# Patient Record
Sex: MALE | Race: WHITE | NOT HISPANIC OR LATINO | Employment: PART TIME | URBAN - METROPOLITAN AREA
[De-identification: names, ages, dates, MRNs, and addresses within clinical notes are randomized per-mention and may not be internally consistent; named-entity substitution may affect disease eponyms.]

---

## 2022-07-07 ENCOUNTER — OFFICE VISIT (OUTPATIENT)
Dept: URGENT CARE | Facility: CLINIC | Age: 22
End: 2022-07-07
Payer: COMMERCIAL

## 2022-07-07 VITALS
RESPIRATION RATE: 16 BRPM | OXYGEN SATURATION: 98 % | HEART RATE: 78 BPM | TEMPERATURE: 99.6 F | DIASTOLIC BLOOD PRESSURE: 78 MMHG | SYSTOLIC BLOOD PRESSURE: 110 MMHG

## 2022-07-07 DIAGNOSIS — L02.91 CUTANEOUS ABSCESS, UNSPECIFIED SITE: ICD-10-CM

## 2022-07-07 DIAGNOSIS — L03.113 CELLULITIS OF RIGHT UPPER EXTREMITY: Primary | ICD-10-CM

## 2022-07-07 PROCEDURE — 99213 OFFICE O/P EST LOW 20 MIN: CPT | Performed by: PHYSICIAN ASSISTANT

## 2022-07-07 PROCEDURE — 87205 SMEAR GRAM STAIN: CPT | Performed by: PHYSICIAN ASSISTANT

## 2022-07-07 PROCEDURE — 87186 SC STD MICRODIL/AGAR DIL: CPT | Performed by: PHYSICIAN ASSISTANT

## 2022-07-07 PROCEDURE — 87070 CULTURE OTHR SPECIMN AEROBIC: CPT | Performed by: PHYSICIAN ASSISTANT

## 2022-07-07 RX ORDER — SULFAMETHOXAZOLE AND TRIMETHOPRIM 400; 80 MG/1; MG/1
2 TABLET ORAL EVERY 12 HOURS SCHEDULED
COMMUNITY

## 2022-07-07 RX ORDER — AMOXICILLIN AND CLAVULANATE POTASSIUM 875; 125 MG/1; MG/1
1 TABLET, FILM COATED ORAL EVERY 12 HOURS SCHEDULED
COMMUNITY

## 2022-07-08 NOTE — PATIENT INSTRUCTIONS
Abscess   AMBULATORY CARE:   An abscess  is an area under the skin where pus (infected fluid) collects  An abscess is often caused by bacteria, fungi or other germs that get into an open wound  You can get an abscess anywhere on your body  Common signs and symptoms of an abscess: You may have a swollen mass that is red and painful  Pus may leak out of the mass  The pus will be white or yellow and may smell bad  You may have redness and pain days before the mass appears  You may have a fever and chills if the infection spreads  Seek immediate care if:   The area around your abscess becomes very painful, warm, or has red streaks  You have a fever and chills  Your heart is beating faster than usual     You feel faint or confused  Call your doctor if:   Your abscess gets bigger or does not get better  Your abscess returns  You have questions or concerns about your condition or care  Treatment for an abscess: Your healthcare provider may need to make a cut in the abscess to allow the pus to drain  You may need surgery to remove your abscess  You may  need any of the following:  Antibiotics  help treat a bacterial infection  Acetaminophen  decreases pain and fever  It is available without a doctor's order  Ask how much to take and how often to take it  Follow directions  Read the labels of all other medicines you are using to see if they also contain acetaminophen, or ask your doctor or pharmacist  Acetaminophen can cause liver damage if not taken correctly  Do not use more than 4 grams (4,000 milligrams) total of acetaminophen in one day  NSAIDs , such as ibuprofen, help decrease swelling, pain, and fever  This medicine is available with or without a doctor's order  NSAIDs can cause stomach bleeding or kidney problems in certain people  If you take blood thinner medicine, always ask your healthcare provider if NSAIDs are safe for you   Always read the medicine label and follow directions  Take your medicine as directed  Contact your healthcare provider if you think your medicine is not helping or if you have side effects  Tell him or her if you are allergic to any medicine  Keep a list of the medicines, vitamins, and herbs you take  Include the amounts, and when and why you take them  Bring the list or the pill bottles to follow-up visits  Carry your medicine list with you in case of an emergency  Self-care:   Apply a warm compress to your abscess  This will help it open and drain  Wet a washcloth in warm, but not hot, water  Apply the compress for 10 minutes  Repeat this 4 times each day  Do not  press on an abscess or try to open it with a needle  You may push the bacteria deeper or into your blood  Do not share your clothes, towels, or sheets with anyone  This can spread the infection to others  Wash your hands often  This can help prevent the spread of germs  Use soap and water or an alcohol-based hand rub  Care for your wound after it is drained:   Care for your wound as directed  If your healthcare provider says it is okay, carefully remove the bandage and gauze packing  You may need to soak the gauze to get it out of your wound  Clean your wound and the area around it as directed  Dry the area and put on new, clean bandages  Change your bandages when they get wet or dirty  Ask your healthcare provider how to change the gauze in your wound  Keep track of how many pieces of gauze are placed inside the wound  Do not put too much packing in the wound  Do not pack the gauze too tightly in your wound  Follow up with your healthcare provider in 1 to 3 days: You may need to have your packing removed or your bandage changed  Write down your questions so you remember to ask them during your visits  © Copyright [employer] 2022 Information is for End User's use only and may not be sold, redistributed or otherwise used for commercial purposes   All illustrations and images included in CareNotes® are the copyrighted property of [employer] D [employer]  or Codie Ellis   The above information is an  only  It is not intended as medical advice for individual conditions or treatments  Talk to your doctor, nurse or pharmacist before following any medical regimen to see if it is safe and effective for you  Cellulitis   AMBULATORY CARE:   Cellulitis  is a skin infection caused by bacteria  Cellulitis is common and can become severe  Cellulitis usually appears on the lower legs  It can also appear on the arms, face, and other areas  Cellulitis develops when bacteria enter a crack or break in your skin, such as a scratch, bite, or cut  Common signs and symptoms:  Signs and symptoms usually appear on one side of your body  You may have any of the following:  A fever    A red, warm, swollen area on your skin    Pain when the area is touched    Red spots, bumps, or blisters that may drain pus    Bumpy, raised skin that feels like an orange peel    Seek care immediately if:   Your wound gets larger and more painful  You feel a crackling under your skin when you touch it  You have purple dots or bumps on your skin, or you see bleeding under your skin  You see red streaks coming from the infected area  Call your doctor if:   The red, warm, swollen area gets larger  Your fever or pain does not go away or gets worse  The area does not get smaller after 3 days of antibiotics  You have questions or concerns about your condition or care  Treatment:  You should start to see improvement in 3 days  If your cellulitis is severe, you may need IV antibiotics in the hospital  If cellulitis is not treated, the infection can spread through your body and become life-threatening  You may need any of the following medicines:  Antibiotics  help treat the bacterial infection  Acetaminophen  decreases pain and fever   It is available without a doctor's order  Ask how much to take and how often to take it  Follow directions  Read the labels of all other medicines you are using to see if they also contain acetaminophen, or ask your doctor or pharmacist  Acetaminophen can cause liver damage if not taken correctly  Do not use more than 4 grams (4,000 milligrams) total of acetaminophen in one day  NSAIDs , such as ibuprofen, help decrease swelling, pain, and fever  This medicine is available with or without a doctor's order  NSAIDs can cause stomach bleeding or kidney problems in certain people  If you take blood thinner medicine, always ask your healthcare provider if NSAIDs are safe for you  Always read the medicine label and follow directions  Take your medicine as directed  Contact your healthcare provider if you think your medicine is not helping or if you have side effects  Tell him or her if you are allergic to any medicine  Keep a list of the medicines, vitamins, and herbs you take  Include the amounts, and when and why you take them  Bring the list or the pill bottles to follow-up visits  Carry your medicine list with you in case of an emergency  Self-care:   Wash the area with soap and water every day  Gently pat dry  Use bandages if directed by your healthcare provider  Elevate the area above the level of your heart  as often as you can  This will help decrease swelling and pain  Prop the area on pillows or blankets to keep it elevated comfortably  Place a cool, damp cloth on the area  Use clean cloths and clean water  You can do this as often as you need to  Cool, damp cloths may help decrease pain  Apply cream or ointment as directed  These help protect the area  Most over-the-counter products, such as petroleum jelly, are good to use  Ask your healthcare provider about specific creams or ointments you should use  Prevent cellulitis:   Do not scratch bug bites or areas of injury    You increase your risk for cellulitis by scratching these areas  Do not share personal items, such as towels, clothing, and razors  Clean exercise equipment  with germ-killing  before and after you use it  Treat athlete's foot  This can help prevent the spread of a bacterial skin infection  Wash your hands often  Use soap and water  Wash your hands after you use the bathroom, change a child's diapers, or sneeze  Wash your hands before you prepare or eat food  Use lotion to prevent dry, cracked skin  Follow up with your doctor within 3 days, or as directed:  He or she will check if your cellulitis is getting better  Write down your questions so you remember to ask them during your visits  © Copyright "[employer] 2022 Information is for End User's use only and may not be sold, redistributed or otherwise used for commercial purposes  All illustrations and images included in CareNotes® are the copyrighted property of A [employer] M [employer] Inc  or Codie Ellis   The above information is an  only  It is not intended as medical advice for individual conditions or treatments  Talk to your doctor, nurse or pharmacist before following any medical regimen to see if it is safe and effective for you

## 2022-07-11 LAB
BACTERIA WND AEROBE CULT: ABNORMAL
GRAM STN SPEC: ABNORMAL

## 2022-07-11 NOTE — PROGRESS NOTES
[facility]        NAME: Angelika Jimenez is a 23 y o  male  : 2000    MRN: [id number]  DATE: 2022  TIME: 12:33 AM    Assessment and Plan   Cellulitis of right upper extremity [L03 113]  1  Cellulitis of right upper extremity     2  Cutaneous abscess, unspecified site  Wound culture and Gram stain         Patient Instructions       Follow up with PCP in 3-5 days  Proceed to  ER if symptoms worsen  Chief Complaint     Chief Complaint   Patient presents with    Skin Problem     Began about one week ago and is getting larger  Pt was seen yesterday at an urgent care and was given bactrim and augmentin         History of Present Illness       51-year-old male presents the clinic with skin infection that started 1 week ago  Patient states that he was seen yesterday at a different Urgent Care and was given Bactrim and Augmentin for an infection to his right elbow/forearm  Patient states that he started the medication and has also done warm compresses to the area but notes soreness to the muscle around the area of redness and swelling and noticed that the area of redness appears to be larger than it was yesterday  Review of Systems   Review of Systems   Constitutional: Negative for chills and fever  Respiratory: Negative for chest tightness, shortness of breath and wheezing  Cardiovascular: Negative for chest pain and palpitations  Gastrointestinal: Negative for nausea and vomiting  Musculoskeletal: Positive for myalgias  Negative for arthralgias  Skin: Positive for color change and wound  Negative for rash  Neurological: Negative for dizziness, weakness, light-headedness, numbness and headaches  All other systems reviewed and are negative          Current Medications       Current Outpatient Medications:     amoxicillin-clavulanate (AUGMENTIN) 875-125 mg per tablet, Take 1 tablet by mouth every 12 (twelve) hours, Disp: , Rfl:     sulfamethoxazole-trimethoprim (BACTRIM) 400-80 mg per tablet, Take 2 tablets by mouth every 12 (twelve) hours, Disp: , Rfl:     Current Allergies     Allergies as of 07/07/2022    (No Known Allergies)            The following portions of the patient's history were reviewed and updated as appropriate: allergies, current medications, past family history, past medical history, past social history, past surgical history and problem list      Past Medical History:   Diagnosis Date    Patient denies medical problems        History reviewed  No pertinent surgical history  History reviewed  No pertinent family history  Medications have been verified  Objective   /78   Pulse 78   Temp 99 6 °F (37 6 °C)   Resp 16   SpO2 98%   No LMP for male patient  Physical Exam     Physical Exam  Vitals and nursing note reviewed  Constitutional:       General: He is not in acute distress  Appearance: He is well-developed  He is not diaphoretic  HENT:      Head: Normocephalic and atraumatic  Cardiovascular:      Pulses: Normal pulses  Pulmonary:      Effort: Pulmonary effort is normal    Musculoskeletal:         General: Normal range of motion  Right elbow: Swelling present  Normal range of motion  Tenderness present  Arms:       Comments: Area of localized erythema, swelling with approximately for pustules noted  Pustules were superficially opened with 18 gauge needle and wound culture was obtained to rule out MRSA  Discussed with patient that he needs to watch for worsening signs of infection and continue taking antibiotics as prescribed  No red streak noted on exam    Skin:     General: Skin is warm and dry  Neurological:      Mental Status: He is alert and oriented to person, place, and time

## 2023-06-18 ENCOUNTER — APPOINTMENT (EMERGENCY)
Dept: CT IMAGING | Facility: HOSPITAL | Age: 23
End: 2023-06-18
Payer: COMMERCIAL

## 2023-06-18 ENCOUNTER — HOSPITAL ENCOUNTER (EMERGENCY)
Facility: HOSPITAL | Age: 23
Discharge: HOME | End: 2023-06-18
Attending: EMERGENCY MEDICINE
Payer: COMMERCIAL

## 2023-06-18 VITALS
TEMPERATURE: 98.7 F | RESPIRATION RATE: 18 BRPM | SYSTOLIC BLOOD PRESSURE: 114 MMHG | HEART RATE: 94 BPM | OXYGEN SATURATION: 98 % | WEIGHT: 135 LBS | HEIGHT: 67 IN | DIASTOLIC BLOOD PRESSURE: 74 MMHG | BODY MASS INDEX: 21.19 KG/M2

## 2023-06-18 DIAGNOSIS — S29.019A THORACIC MYOFASCIAL STRAIN, INITIAL ENCOUNTER: ICD-10-CM

## 2023-06-18 DIAGNOSIS — S09.90XA MINOR HEAD INJURY, INITIAL ENCOUNTER: ICD-10-CM

## 2023-06-18 DIAGNOSIS — S00.81XA ABRASION OF FACE, INITIAL ENCOUNTER: ICD-10-CM
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
## 2023-06-18 DIAGNOSIS — S09.90XA MINOR CLOSED HEAD INJURY: ICD-10-CM

## 2023-06-18 LAB
ANION GAP SERPL CALCULATED.3IONS-SCNC: 6 MMOL/L (ref 4–13)
BASOPHILS # BLD AUTO: 0.03 THOUSANDS/ÂΜL (ref 0–0.1)
BASOPHILS NFR BLD AUTO: 0 % (ref 0–1)
BUN SERPL-MCNC: 11 MG/DL (ref 5–25)
CALCIUM SERPL-MCNC: 9.7 MG/DL (ref 8.4–10.2)
CHLORIDE SERPL-SCNC: 104 MMOL/L (ref 96–108)
CO2 SERPL-SCNC: 32 MMOL/L (ref 21–32)
CREAT SERPL-MCNC: 0.96 MG/DL (ref 0.6–1.3)
EOSINOPHIL # BLD AUTO: 0.3 THOUSAND/ÂΜL (ref 0–0.61)
EOSINOPHIL NFR BLD AUTO: 4 % (ref 0–6)
ERYTHROCYTE [DISTWIDTH] IN BLOOD BY AUTOMATED COUNT: 12 % (ref 11.6–15.1)
GFR SERPL CREATININE-BSD FRML MDRD: 112 ML/MIN/1.73SQ M
GLUCOSE SERPL-MCNC: 92 MG/DL (ref 65–140)
HCT VFR BLD AUTO: 50 % (ref 36.5–49.3)
HGB BLD-MCNC: 16.9 G/DL (ref 12–17)
IMM GRANULOCYTES # BLD AUTO: 0.01 THOUSAND/UL (ref 0–0.2)
IMM GRANULOCYTES NFR BLD AUTO: 0 % (ref 0–2)
LYMPHOCYTES # BLD AUTO: 2.56 THOUSANDS/ÂΜL (ref 0.6–4.47)
LYMPHOCYTES NFR BLD AUTO: 34 % (ref 14–44)
MCH RBC QN AUTO: 29.7 PG (ref 26.8–34.3)
MCHC RBC AUTO-ENTMCNC: 33.8 G/DL (ref 31.4–37.4)
MCV RBC AUTO: 88 FL (ref 82–98)
MONOCYTES # BLD AUTO: 0.54 THOUSAND/ÂΜL (ref 0.17–1.22)
MONOCYTES NFR BLD AUTO: 7 % (ref 4–12)
NEUTROPHILS # BLD AUTO: 4.09 THOUSANDS/ÂΜL (ref 1.85–7.62)
NEUTS SEG NFR BLD AUTO: 55 % (ref 43–75)
NRBC BLD AUTO-RTO: 0 /100 WBCS
PLATELET # BLD AUTO: 248 THOUSANDS/UL (ref 149–390)
PMV BLD AUTO: 9.8 FL (ref 8.9–12.7)
POTASSIUM SERPL-SCNC: 3.9 MMOL/L (ref 3.5–5.3)
RBC # BLD AUTO: 5.69 MILLION/UL (ref 3.88–5.62)
SODIUM SERPL-SCNC: 142 MMOL/L (ref 135–147)
WBC # BLD AUTO: 7.53 THOUSAND/UL (ref 4.31–10.16)

## 2023-06-18 PROCEDURE — 99284 EMERGENCY DEPT VISIT MOD MDM: CPT

## 2023-06-18 PROCEDURE — 71260 CT THORAX DX C+: CPT

## 2023-06-18 PROCEDURE — 85025 COMPLETE CBC W/AUTO DIFF WBC: CPT | Performed by: PHYSICIAN ASSISTANT

## 2023-06-18 PROCEDURE — 74177 CT ABD & PELVIS W/CONTRAST: CPT

## 2023-06-18 PROCEDURE — 36415 COLL VENOUS BLD VENIPUNCTURE: CPT | Performed by: PHYSICIAN ASSISTANT

## 2023-06-18 PROCEDURE — 80048 BASIC METABOLIC PNL TOTAL CA: CPT | Performed by: PHYSICIAN ASSISTANT

## 2023-06-18 PROCEDURE — 70450 CT HEAD/BRAIN W/O DYE: CPT

## 2023-06-18 RX ADMIN — IOHEXOL 100 ML: 350 INJECTION, SOLUTION INTRAVENOUS at 14:41

## 2023-06-18 NOTE — Clinical Note
Mercy Suzie was seen and treated in our emergency department on 6/18/2023  No restrictions            Diagnosis:     Alexia Dangelo    He may return on this date: If you have any questions or concerns, please don't hesitate to call        Ignacio Chen DO    ______________________________           _______________          _______________  Hospital Representative                              Date                                Time

## 2023-06-18 NOTE — Clinical Note
Charity Jacobo was seen and treated in our emergency department on 6/18/2023  No restrictions            Diagnosis:     Olga Lidia Woods    He may return on this date: If you have any questions or concerns, please don't hesitate to call        Evie Corado, DO    ______________________________           _______________          _______________  Hospital Representative                              Date                                Time
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
## 2024-01-21 ENCOUNTER — HOSPITAL ENCOUNTER (EMERGENCY)
Facility: HOSPITAL | Age: 24
Discharge: HOME | End: 2024-01-21
Attending: EMERGENCY MEDICINE
Payer: COMMERCIAL

## 2024-01-21 VITALS
BODY MASS INDEX: 21.97 KG/M2 | WEIGHT: 140 LBS | HEART RATE: 59 BPM | HEIGHT: 67 IN | OXYGEN SATURATION: 99 % | TEMPERATURE: 97.9 F | DIASTOLIC BLOOD PRESSURE: 64 MMHG | SYSTOLIC BLOOD PRESSURE: 120 MMHG | RESPIRATION RATE: 16 BRPM

## 2024-01-21 DIAGNOSIS — L02.619 CELLULITIS AND ABSCESS OF FOOT: Primary | ICD-10-CM

## 2024-01-21 DIAGNOSIS — L03.119 CELLULITIS AND ABSCESS OF FOOT: Primary | ICD-10-CM

## 2024-01-21 PROCEDURE — 99283 EMERGENCY DEPT VISIT LOW MDM: CPT

## 2024-01-21 PROCEDURE — 10060 I&D ABSCESS SIMPLE/SINGLE: CPT | Performed by: EMERGENCY MEDICINE

## 2024-01-21 PROCEDURE — 99284 EMERGENCY DEPT VISIT MOD MDM: CPT | Performed by: EMERGENCY MEDICINE

## 2024-01-21 RX ORDER — SULFAMETHOXAZOLE AND TRIMETHOPRIM 800; 160 MG/1; MG/1
1 TABLET ORAL 2 TIMES DAILY
Qty: 14 TABLET | Refills: 0 | Status: SHIPPED | OUTPATIENT
Start: 2024-01-21 | End: 2024-01-28

## 2024-01-21 RX ORDER — CEPHALEXIN 500 MG/1
500 CAPSULE ORAL EVERY 6 HOURS SCHEDULED
Qty: 28 CAPSULE | Refills: 0 | Status: SHIPPED | OUTPATIENT
Start: 2024-01-21 | End: 2024-01-28

## 2024-01-21 RX ORDER — CEPHALEXIN 250 MG/1
500 CAPSULE ORAL ONCE
Status: COMPLETED | OUTPATIENT
Start: 2024-01-21 | End: 2024-01-21

## 2024-01-21 RX ORDER — SULFAMETHOXAZOLE AND TRIMETHOPRIM 800; 160 MG/1; MG/1
1 TABLET ORAL ONCE
Status: COMPLETED | OUTPATIENT
Start: 2024-01-21 | End: 2024-01-21

## 2024-01-21 RX ORDER — LIDOCAINE HYDROCHLORIDE AND EPINEPHRINE 10; 10 MG/ML; UG/ML
5 INJECTION, SOLUTION INFILTRATION; PERINEURAL ONCE
Status: COMPLETED | OUTPATIENT
Start: 2024-01-21 | End: 2024-01-21

## 2024-01-21 RX ADMIN — CEPHALEXIN 500 MG: 250 CAPSULE ORAL at 02:47

## 2024-01-21 RX ADMIN — SULFAMETHOXAZOLE AND TRIMETHOPRIM 1 TABLET: 800; 160 TABLET ORAL at 02:47

## 2024-01-21 RX ADMIN — LIDOCAINE HYDROCHLORIDE,EPINEPHRINE BITARTRATE 5 ML: 10; .01 INJECTION, SOLUTION INFILTRATION; PERINEURAL at 02:47

## 2024-01-21 NOTE — ED PROVIDER NOTES
"History  Chief Complaint   Patient presents with    Foot Pain     R foot, noticed white \"pimple like\" bump on R 4th toe 1-2 days ago, tried popping it but nothing came out, now with pain at that area     Patient is a 23 year old male who presents with left toe and foot redness and pain.  Patient reports that 2 days ago he noticed a pimple on the top of the fourth toe that he tried to pop.  He states that nothing came out, but shortly afterwards there was redness spreading from that toe up the foot.  He states that the red area is hot to touch and there is some swelling now.  Denies any fevers, chills, numbness, tingling, any known trauma.        Prior to Admission Medications   Prescriptions Last Dose Informant Patient Reported? Taking?   amoxicillin-clavulanate (AUGMENTIN) 875-125 mg per tablet   Yes No   Sig: Take 1 tablet by mouth every 12 (twelve) hours   sulfamethoxazole-trimethoprim (BACTRIM) 400-80 mg per tablet   Yes No   Sig: Take 2 tablets by mouth every 12 (twelve) hours      Facility-Administered Medications: None       Past Medical History:   Diagnosis Date    Patient denies medical problems        Past Surgical History:   Procedure Laterality Date    KNEE SURGERY Bilateral        History reviewed. No pertinent family history.  I have reviewed and agree with the history as documented.    E-Cigarette/Vaping    E-Cigarette Use Current Every Day User      E-Cigarette/Vaping Substances     Social History     Tobacco Use    Smoking status: Unknown   Vaping Use    Vaping status: Every Day   Substance Use Topics    Alcohol use: Not Currently    Drug use: Never       Review of Systems   Constitutional:  Negative for chills and fever.   Skin:  Positive for color change.   Neurological:  Negative for weakness and numbness.       Physical Exam  Physical Exam  Vitals and nursing note reviewed.   Constitutional:       General: He is not in acute distress.     Appearance: Normal appearance. He is not ill-appearing, " toxic-appearing or diaphoretic.   HENT:      Head: Normocephalic and atraumatic.      Mouth/Throat:      Mouth: Mucous membranes are moist.   Eyes:      Conjunctiva/sclera: Conjunctivae normal.      Pupils: Pupils are equal, round, and reactive to light.   Cardiovascular:      Rate and Rhythm: Normal rate and regular rhythm.      Pulses: Normal pulses.      Heart sounds: Normal heart sounds. No murmur heard.  Pulmonary:      Effort: Pulmonary effort is normal. No respiratory distress.      Breath sounds: Normal breath sounds. No stridor. No wheezing, rhonchi or rales.   Chest:      Chest wall: No tenderness.   Musculoskeletal:      Right foot: Normal range of motion and normal capillary refill. Swelling and tenderness present. No deformity, laceration, bony tenderness or crepitus. Normal pulse.        Feet:    Skin:     General: Skin is warm and dry.   Neurological:      General: No focal deficit present.      Mental Status: He is alert and oriented to person, place, and time. Mental status is at baseline.         Vital Signs  ED Triage Vitals [01/21/24 0236]   Temperature Pulse Respirations Blood Pressure SpO2   97.9 °F (36.6 °C) 59 16 120/64 99 %      Temp Source Heart Rate Source Patient Position - Orthostatic VS BP Location FiO2 (%)   Oral Monitor Lying Right arm --      Pain Score       7           Vitals:    01/21/24 0236   BP: 120/64   Pulse: 59   Patient Position - Orthostatic VS: Lying         Visual Acuity      ED Medications  Medications   lidocaine-epinephrine (XYLOCAINE/EPINEPHRINE) 1 %-1:100,000 injection 5 mL (5 mL Infiltration Given by Other 1/21/24 0247)   sulfamethoxazole-trimethoprim (BACTRIM DS) 800-160 mg per tablet 1 tablet (1 tablet Oral Given 1/21/24 0247)   cephalexin (KEFLEX) capsule 500 mg (500 mg Oral Given 1/21/24 0247)       Diagnostic Studies  Results Reviewed       None                   No orders to display              Procedures  Incision and drain    Date/Time: 1/21/2024 2:50  AM    Performed by: Kelsey Horne DO  Authorized by: Kelsey Horne DO  Universal Protocol:  Procedure performed by:  Consent: Verbal consent obtained.  Risks and benefits: risks, benefits and alternatives were discussed  Required items: required blood products, implants, devices, and special equipment available  Patient identity confirmed: verbally with patient    Patient location:  ED  Location:     Type:  Abscess    Size:  0.5    Location:  Lower extremity    Lower extremity location:  R fourth toe  Pre-procedure details:     Skin preparation:  Antiseptic wash  Anesthesia (see MAR for exact dosages):     Anesthesia method:  Topical application (1)  Procedure details:     Complexity:  Simple    Needle aspiration: no      Incision types:  Stab incision    Scalpel blade:  11    Incision depth:  Subcutaneous    Drainage:  Purulent    Drainage amount:  Scant    Wound treatment:  Wound left open    Packing materials:  None  Post-procedure details:     Patient tolerance of procedure:  Tolerated well, no immediate complications           ED Course                               SBIRT 22yo+      Flowsheet Row Most Recent Value   Initial Alcohol Screen: US AUDIT-C     1. How often do you have a drink containing alcohol? 0 Filed at: 01/21/2024 0249   2. How many drinks containing alcohol do you have on a typical day you are drinking?  0 Filed at: 01/21/2024 0249   3a. Male UNDER 65: How often do you have five or more drinks on one occasion? 0 Filed at: 01/21/2024 0249   Audit-C Score 0 Filed at: 01/21/2024 0249   MAREK: How many times in the past year have you...    Used an illegal drug or used a prescription medication for non-medical reasons? Never Filed at: 01/21/2024 0249                      Medical Decision Making  Assessment and Plan:   Perform I&D of the abscess and prescribe keflex/bactrim for cellulitis. Reviewed strict return precautions which patient verbalized understanding of.     Risk  Prescription drug  management.             Disposition  Final diagnoses:   Cellulitis and abscess of foot     Time reflects when diagnosis was documented in both MDM as applicable and the Disposition within this note       Time User Action Codes Description Comment    1/21/2024  2:43 AM Kelsey Horne Add [L03.119,  L02.619] Cellulitis and abscess of foot           ED Disposition       ED Disposition   Discharge    Condition   Stable    Date/Time   Sun Jan 21, 2024 0243    Comment   David Thomas discharge to home/self care.                   Follow-up Information       Follow up With Specialties Details Why Contact Info Additional Information     [facility] Emergency Department Emergency Medicine Go to  As needed, If symptoms worsen, for re-evaluation [address] [place] [id number] [phone] [facility] Emergency Department, [address] [place] [id number]            Patient's Medications   Discharge Prescriptions    CEPHALEXIN (KEFLEX) 500 MG CAPSULE    Take 1 capsule (500 mg total) by mouth every 6 (six) hours for 7 days       Start Date: 1/21/2024 End Date: 1/28/2024       Order Dose: 500 mg       Quantity: 28 capsule    Refills: 0    SULFAMETHOXAZOLE-TRIMETHOPRIM (BACTRIM DS) 800-160 MG PER TABLET    Take 1 tablet by mouth 2 (two) times a day for 7 days smx-tmp DS (BACTRIM) 800-160 mg tabs (1tab q12 D10)       Start Date: 1/21/2024 End Date: 1/28/2024       Order Dose: 1 tablet       Quantity: 14 tablet    Refills: 0       No discharge procedures on file.    PDMP Review       None            ED Provider  Electronically Signed by             Kelsey Horne DO  01/21/24 0250